# Patient Record
Sex: MALE | Race: WHITE
[De-identification: names, ages, dates, MRNs, and addresses within clinical notes are randomized per-mention and may not be internally consistent; named-entity substitution may affect disease eponyms.]

---

## 2017-11-27 ENCOUNTER — HOSPITAL ENCOUNTER (OUTPATIENT)
Dept: HOSPITAL 25 - ED | Age: 52
Setting detail: OBSERVATION
LOS: 1 days | Discharge: HOME | End: 2017-11-28
Attending: INTERNAL MEDICINE | Admitting: HOSPITALIST
Payer: COMMERCIAL

## 2017-11-27 DIAGNOSIS — Z23: ICD-10-CM

## 2017-11-27 DIAGNOSIS — M51.36: ICD-10-CM

## 2017-11-27 DIAGNOSIS — M25.551: ICD-10-CM

## 2017-11-27 DIAGNOSIS — K21.9: ICD-10-CM

## 2017-11-27 DIAGNOSIS — Z79.899: ICD-10-CM

## 2017-11-27 DIAGNOSIS — G47.33: ICD-10-CM

## 2017-11-27 DIAGNOSIS — M51.26: Primary | ICD-10-CM

## 2017-11-27 DIAGNOSIS — I10: ICD-10-CM

## 2017-11-27 LAB
ALBUMIN SERPL BCG-MCNC: 4.5 G/DL (ref 3.2–5.2)
ALP SERPL-CCNC: 44 U/L (ref 34–104)
ALT SERPL W P-5'-P-CCNC: 19 U/L (ref 7–52)
ANION GAP SERPL CALC-SCNC: 9 MMOL/L (ref 2–11)
AST SERPL-CCNC: 19 U/L (ref 13–39)
BUN SERPL-MCNC: 11 MG/DL (ref 6–24)
BUN/CREAT SERPL: 12.9 (ref 8–20)
CALCIUM SERPL-MCNC: 9.7 MG/DL (ref 8.6–10.3)
CHLORIDE SERPL-SCNC: 104 MMOL/L (ref 101–111)
GLOBULIN SER CALC-MCNC: 2.8 G/DL (ref 2–4)
GLUCOSE SERPL-MCNC: 98 MG/DL (ref 70–100)
HCO3 SERPL-SCNC: 25 MMOL/L (ref 22–32)
HCT VFR BLD AUTO: 44 % (ref 42–52)
HGB BLD-MCNC: 14.8 G/DL (ref 14–18)
MCH RBC QN AUTO: 30 PG (ref 27–31)
MCHC RBC AUTO-ENTMCNC: 34 G/DL (ref 31–36)
MCV RBC AUTO: 87 FL (ref 80–94)
POTASSIUM SERPL-SCNC: 3.5 MMOL/L (ref 3.5–5)
PROT SERPL-MCNC: 7.3 G/DL (ref 6.4–8.9)
RBC # BLD AUTO: 4.98 10^6/UL (ref 4–5.4)
SODIUM SERPL-SCNC: 138 MMOL/L (ref 133–145)
WBC # BLD AUTO: 9.7 10^3/UL (ref 3.5–10.8)

## 2017-11-27 PROCEDURE — 72148 MRI LUMBAR SPINE W/O DYE: CPT

## 2017-11-27 PROCEDURE — 99284 EMERGENCY DEPT VISIT MOD MDM: CPT

## 2017-11-27 PROCEDURE — G0008 ADMIN INFLUENZA VIRUS VAC: HCPCS

## 2017-11-27 PROCEDURE — 80053 COMPREHEN METABOLIC PANEL: CPT

## 2017-11-27 PROCEDURE — 36415 COLL VENOUS BLD VENIPUNCTURE: CPT

## 2017-11-27 PROCEDURE — 96374 THER/PROPH/DIAG INJ IV PUSH: CPT

## 2017-11-27 PROCEDURE — 96375 TX/PRO/DX INJ NEW DRUG ADDON: CPT

## 2017-11-27 PROCEDURE — G0378 HOSPITAL OBSERVATION PER HR: HCPCS

## 2017-11-27 PROCEDURE — 90686 IIV4 VACC NO PRSV 0.5 ML IM: CPT

## 2017-11-27 PROCEDURE — 85025 COMPLETE CBC W/AUTO DIFF WBC: CPT

## 2017-11-27 PROCEDURE — 96372 THER/PROPH/DIAG INJ SC/IM: CPT

## 2017-11-27 PROCEDURE — 86140 C-REACTIVE PROTEIN: CPT

## 2017-11-27 PROCEDURE — 72131 CT LUMBAR SPINE W/O DYE: CPT

## 2017-11-27 PROCEDURE — 72100 X-RAY EXAM L-S SPINE 2/3 VWS: CPT

## 2017-11-27 PROCEDURE — 96376 TX/PRO/DX INJ SAME DRUG ADON: CPT

## 2017-11-27 PROCEDURE — 90471 IMMUNIZATION ADMIN: CPT

## 2017-11-27 RX ADMIN — SODIUM CHLORIDE SCH MLS/HR: 900 IRRIGANT IRRIGATION at 22:39

## 2017-11-27 RX ADMIN — SENNOSIDES SCH TAB: 8.6 TABLET, FILM COATED ORAL at 22:38

## 2017-11-27 RX ADMIN — HEPARIN SODIUM SCH UNITS: 5000 INJECTION INTRAVENOUS; SUBCUTANEOUS at 22:37

## 2017-11-27 NOTE — RAD
INDICATION:  Low back pain.



COMPARISON:  Comparison is made with a prior x-ray study of the lumbar spine from January 12, 2016.



TECHNIQUE: 3 views of the lumbar spine were obtained including lateral, AP and a

coned-down lateral view of the lumbar sacral junction.



FINDINGS: There is a mild lumbar scoliosis convex toward the left side.  No fracture is

seen.



There is moderate degenerative disc disease at the L2-L3 level which appears unchanged.



IMPRESSION:  MODERATE DEGENERATIVE DISC DISEASE.

## 2017-11-28 VITALS — DIASTOLIC BLOOD PRESSURE: 74 MMHG | SYSTOLIC BLOOD PRESSURE: 133 MMHG

## 2017-11-28 RX ADMIN — HEPARIN SODIUM SCH: 5000 INJECTION INTRAVENOUS; SUBCUTANEOUS at 15:17

## 2017-11-28 RX ADMIN — KETOROLAC TROMETHAMINE PRN MG: 30 INJECTION, SOLUTION INTRAMUSCULAR; INTRAVENOUS at 00:48

## 2017-11-28 RX ADMIN — SODIUM CHLORIDE SCH MLS/HR: 900 IRRIGANT IRRIGATION at 05:20

## 2017-11-28 RX ADMIN — HEPARIN SODIUM SCH UNITS: 5000 INJECTION INTRAVENOUS; SUBCUTANEOUS at 05:36

## 2017-11-28 RX ADMIN — SENNOSIDES SCH TAB: 8.6 TABLET, FILM COATED ORAL at 08:30

## 2017-11-28 RX ADMIN — KETOROLAC TROMETHAMINE PRN MG: 30 INJECTION, SOLUTION INTRAMUSCULAR; INTRAVENOUS at 08:30

## 2017-11-28 NOTE — RAD
Indication: Back pain with radiation down the right lower extremity.



Image Sequences: Sagittal T1, T2, STIR, axial T1 and T2-weighted images of the lumbar

spine were obtained.



The vertebral bodies appear normal in height. Normal bone marrow signal is noted.



At L5-S1, there is degenerative disc disease noted. Broad-based protrusion flattens the

thecal sac. No central or foraminal stenosis is noted.



At L4-5, there is minimal broad-based protrusion flattening the thecal sac. No central or

foraminal stenosis is noted. Moderate facet hypertrophy is noted.



At L3-4, there is degenerative disc disease noted. Moderate-sized central focal protrusion

is noted indenting the thecal sac. There is an extruded disc fragment which has migrated

inferiorly and appears to impinge upon the right descending L4 nerve root at this level.

The disc appears to be in the lateral recess on the right.



At L2-3, degenerative disc disease is noted. Minimal broad-based protrusion is noted.



IMPRESSION: 

1.  Central disc protrusion indenting the thecal sac at L3-4. There is a sequestered

fragment of disc that has migrated inferiorly into the right which appears to impinge upon

the right descending nerve root at L3-4.



2.  Degenerative disc disease at L4-5 and L5-S1.

## 2017-11-29 NOTE — DS
CC:  Bertin Ngo MD *

 

DISCHARGE SUMMARY:

 

DATE OF ADMISSION:  11/27/17

 

DATE OF DISCHARGE:  11/28/17

 

PRIMARY CARE PHYSICIAN:  Bertin Ngo MD

 

PRIMARY DIAGNOSES:

1.  Worsening disk protrusion L3-L4 with small piece of small sequestered 
fragments.

2.  Acute pain exacerbation.

 

SECONDARY DIAGNOSES:  Include:

1.  Chronic lower back pain and hip pain.

2.  Obstructive sleep apnea.

3.  Gastroesophageal reflux disease.

4.  Hypertension.

 

MEDICATIONS AT DISCHARGE:  Include:

1.  Cyclobenzaprine 5 to 10 mg daily as needed.

2.  Diclofenac 75 mg twice daily as needed.

3.  Prilosec 20 mg daily.

4.  Lisinopril 20 mg daily.

5.  Oxycodone 5 mg every 4 hours as needed for pain for 2 days.

6.  Acetaminophen 650 mg every 4 hours as needed for pain.

 

PERTINENT IMAGING STUDIES:  MRI of the L-spine was performed, read was 
discussed with Radiology, interpreted as continued disk protrusion of L3-L4 
with a new piece of small sequestered fragments impinging on nerve roots on the 
right.

 

HISTORY OF PRESENT ILLNESS AND HOSPITAL COURSE:  This is a 52-year-old man with 
past medical history as outlined in the history of present illness on day of 
admission presented to the hospital with worsening right lower back pain that 
developed after moving boxes and working in his garage on 11/21/17 after 
hearing something "pop."  Subsequently, he had a long car ride but the pain 
continues to worsen.  When seen in the emergency room, his pain was 
uncontrolled with high doses of narcotics.  He was admitted to the hospital and 
given 1 dose of high dose steroids, 10 mg of Decadron.  When seen the next 
morning, his pain had largely improved.  He is neurovascularly intact.  He did 
have positive straight leg test on the right and ipsilateral straight leg test 
on the left.  He had intact sensation reflexes and strength, although his right 
lower leg strength was 4/5 limited by pain.  He had no bladder retention or 
incontinence.  No bowel incontinence.  He felt improved since day of discharge, 
although his pain continued.  In the setting of the sequestered disk, he likely 
has a new exacerbation of pain secondary to this problem.  It was expected that 
this would be desiccated over the next 6 weeks without need for acute 
intervention at this time in the absence of neurologic compromise.  However, 
the patient was counseled at length, should he develop any neurological 
compromise including weakness, numbness, bladder or bowel incontinence, he 
should emergently return to the emergency room.  Also counseled that 
expeditious followup with his primary care provider was important.

 

Reasons to return to the hospital including but limited to recurrent or 
worsening symptoms, worsening pain, bladder or bowel incontinence, weakness or 
numbness or tingling in lower extremities discussed at length, the patient 
acknowledged understanding.

 

TIME SPENT:  Greater than 45 minutes was spent on discharging the patient; 
greater than half was spent face-to-face with the patient.

 

 [id number]/[facility] #: [id number]

NANCY

## 2017-12-01 NOTE — ED
Constantin LUCAS Thomas, scribed for Venkata Cruz MD on 11/27/17 at 1447 .





Back Pain





- HPI Summary


HPI Summary: 





The pt is a 51 y/o M presenting to the ED c/o lower back pain that began six 

days ago. The pain has progressively worsened since onset, and the pain 

significantly worsened two days ago. The pain radiates down his right buttock 

and shelter down his right thigh. The pain is constant. The pain is rated 9/10. 

The pain is aggravated by ambulation and positioning. The pain is alleviated by 

lying on his abdomen. The patient has treated the pain with ibuprofen, 

diclofenac, and cyclobenzaprine PTA. Patient denies bladder or bowel 

incontinence. PMHx includes chronic back pain. 





- History of Current Complaint


Chief Complaint: EDBackInjuryPain


Stated Complaint: BACK PAIN


Time Seen by Provider: 11/27/17 14:29


Hx Obtained From: Patient


Onset/Duration: Lasting Days - 6, Still Present, Worse Since - 2 days ago


Onset/Duration: Still Present


Timing: Constant


Back Pain Location: Is Discrete @ - lower


Severity Initially: Mild


Severity Currently: Severe


Pain Intensity: 9


Pain Scale Used: 0-10 Numeric


Aggravating Symptom(s): Other - Ambulation, positioning


Alleviating Symptom(s): Other - Lying on his abdomen


Associated Signs And Symptoms: Negative: Bladder Incontinence, Bowel 

Incontinence





- Allergies/Home Medications


Allergies/Adverse Reactions: 


 Allergies











Allergy/AdvReac Type Severity Reaction Status Date / Time


 


environmental Allergy  Unknown Uncoded 02/08/17 14:39





   Reaction  





   Details  


 


NKDA Allergy  Unknown Uncoded 02/08/17 14:39





   Reaction  





   Details  


 


seasonal Allergy  Unknown Uncoded 02/08/17 14:39





   Reaction  





   Details  











Home Medications: 


 Home Medications





Cyclobenzaprine TAB* [Flexeril 10 MG TAB*] 5 - 10 mg PO DAILY PRN 11/27/17 [

History Confirmed 11/27/17]


Diclofenac Sodium EC TAB* [Voltaren EC TAB*] 75 mg PO BID PRN 11/27/17 [History 

Confirmed 11/27/17]


Omeprazole CAP* [Prilosec CAP* 20 MG] 20 mg PO DAILY 11/27/17 [History 

Confirmed 11/27/17]











PMH/Surg Hx/FS Hx/Imm Hx


Previously Healthy: No


Endocrine/Hematology History: 


   Denies: Hx Diabetes


Cardiovascular History: Reports: Hx Hypercholesterolemia, Hx Hypertension


   Denies: Hx Pacemaker/ICD


Respiratory History: Reports: Hx Seasonal Allergies, Hx Sleep Apnea - 

compliance issues;switched to dental device


 History: 


   Denies: Hx Renal Disease


Musculoskeletal History: Reports: Hx Back Problems - back pain, spasms


Sensory History: 


   Denies: Hx Hearing Aid


Psychiatric History: 


   Denies: Hx Panic Disorder





- Surgical History


Surgery Procedure, Year, and Place: None


Infectious Disease History: No


Infectious Disease History: 


   Denies: Traveled Outside the [place] in Last 30 Days





- Family History


Known Family History: Positive: Hypertension





- Social History


Alcohol Use: Occasionally


Hx Substance Use: No


Substance Use Type: Reports: None


Hx Tobacco Use: No


Smoking Status (MU): Never Smoked Tobacco


Have You Smoked in the Last Year: No





Review of Systems


Negative: Other - bowel incontinence


Negative: incontinence


Positive: Other - Low back pain


All Other Systems Reviewed And Are Negative: Yes





Physical Exam





- Summary


Physical Exam Summary: 





VITAL SIGNS: Reviewed.


GENERAL: Patient is a well-developed and nourished male who is lying 

comfortable in the stretcher. Patient is not in any acute respiratory distress.


HEAD AND FACE: No signs of trauma. No ecchymosis, hematomas or skull 

depressions. No sinus tenderness.


EYES: PERRLA, EOMI x 2, No injected conjunctiva, no nystagmus.


EARS: Hearing grossly intact. Ear canals and tympanic membranes are within 

normal limits.


MOUTH: Oropharynx within normal limits.


NECK: Supple, trachea is midline, no adenopathy, no JVD, no carotid bruit, no c-

spine tenderness, neck with full ROM.


CHEST: Symmetric, no tenderness at palpation


LUNGS: Clear to auscultation bilaterally. No wheezing or crackles.


CVS: Regular rate and rhythm, S1 and S2 present, no murmurs or gallops 

appreciated.


ABDOMEN: Soft, non-tender. No signs of distention. No rebound no guarding, and 

no masses palpated. Bowel sounds are normal.


BACK: There is paraspinal tenderness along the lumbar spine. There is positive 

tenderness in the right gluteus. 


EXTREMITIES: FROM in all major joints, no edema, no cyanosis or clubbing.


NEURO: Alert and oriented x 3. No acute neurological deficits. Speech is normal 

and follows commands.


SKIN: Dry and warm


Triage Information Reviewed: Yes


Vital Signs On Initial Exam: 


 Initial Vitals











Temp Pulse Resp BP Pulse Ox


 


 99.2 F   78   20   159/84   96 


 


 11/27/17 13:57  11/27/17 13:57  11/27/17 13:57  11/27/17 13:57  11/27/17 13:57











Vital Signs Reviewed: Yes





- Rutherford Coma Scale


Coma Scale Total: 15





Diagnostics





- Vital Signs


 Vital Signs











  Temp Pulse Resp BP Pulse Ox


 


 11/27/17 13:57  99.2 F  78  20  159/84  96














- Laboratory


Result Diagrams: 


 11/27/17 15:00





 11/27/17 15:00


Lab Statement: Any lab studies that have been ordered have been reviewed, and 

results considered in the medical decision making process.





- Radiology


  ** XR L-Spine


Xray Interpretation: No Acute Changes - MODERATE DEGENERATIVE DISC DISEASE. ED 

physician has reviewed this report and agrees.


Radiology Interpretation Completed By: Radiologist





- CT


  ** CT L-Spine


CT Interpretation: No Acute Changes - MILD TO MODERATE DEGENERATIVE DISEASE AS 

DESCRIBED. PLEASE FOR ALSO TO EARLIER MRI REPORT. ED physician has reviewed 

this report and agrees.


CT Interpretation Completed By: Radiologist





Back Pain Course/Dx





- Course


Assessment/Plan: The pt is a 51 y/o M presenting to the ED c/o lower back pain 

that began six days ago. The pain has progressively worsened since onset, and 

the pain significantly worsened two days ago. The pain radiates down his right 

buttock and shelter down his right thigh. The pain is constant. The pain is 

rated 9/10. The pain is aggravated by ambulation and positioning. The pain is 

alleviated by lying on his abdomen. The patient has treated the pain with 

ibuprofen, diclofenac, and cyclobenzaprine PTA. Patient denies bladder or bowel 

incontinence. PMHx includes chronic back pain.  Test results are without 

significant abnormalities. The XR L-Spine shows MODERATE DEGENERATIVE DISC 

DISEASE. In the ED course, the patient was given Norflex, morphine, Decadrol, 

and Toradol for the pain. The patient continues to have pain; therefore, I 

decided to do a CT L-Spine which shows no significant abnormalities except DDD. 

The patient was given another dose of morphine; however, the patient is unable 

to ambulate and still has severe pain. Therefore, I discussed the case with Dr. Link who will accept the patient for admission. The patient is 

hemodynamically stable and alert and oriented x3.





- Diagnoses


Provider Diagnoses: 


 Intractable back pain








- Provider Notifications


Discussed Care Of Patient With: Pat Link


Time Discussed With Above Provider: 18:20


Instructed by Provider To: Other - Dr. Link, hospitalist, will admit the 

patient.





Discharge





- Discharge Plan


Condition: Fair


Disposition: ADMITTED TO [facility]


Referrals: 


Bertin Ngo MD [Primary Care Provider] - 





The documentation as recorded by the Constantin marcelo Thomas accurately reflects 

the service I personally performed and the decisions made by me, Venkata Cruz MD.

## 2022-11-08 ENCOUNTER — HOSPITAL ENCOUNTER (EMERGENCY)
Dept: HOSPITAL 92 - CSHERS | Age: 57
Discharge: HOME | End: 2022-11-08

## 2022-11-08 DIAGNOSIS — R07.9: ICD-10-CM

## 2022-11-08 DIAGNOSIS — T63.481A: Primary | ICD-10-CM

## 2022-11-08 DIAGNOSIS — I10: ICD-10-CM

## 2022-11-08 LAB
ALBUMIN SERPL BCG-MCNC: 4.3 G/DL (ref 3.5–5)
ALP SERPL-CCNC: 68 U/L (ref 40–110)
ALT SERPL W P-5'-P-CCNC: 29 U/L (ref 8–55)
ANION GAP SERPL CALC-SCNC: 15 MMOL/L (ref 10–20)
AST SERPL-CCNC: 25 U/L (ref 5–34)
BASOPHILS # BLD AUTO: 0 10X3/UL (ref 0–0.2)
BASOPHILS NFR BLD AUTO: 0.5 % (ref 0–2)
BILIRUB SERPL-MCNC: 1.1 MG/DL (ref 0.2–1.2)
BUN SERPL-MCNC: 9 MG/DL (ref 8.4–25.7)
CALCIUM SERPL-MCNC: 9.7 MG/DL (ref 7.8–10.44)
CHLORIDE SERPL-SCNC: 107 MMOL/L (ref 98–107)
CO2 SERPL-SCNC: 24 MMOL/L (ref 22–29)
CREAT CL PREDICTED SERPL C-G-VRATE: 0 ML/MIN (ref 70–130)
EOSINOPHIL # BLD AUTO: 0.3 10X3/UL (ref 0–0.5)
EOSINOPHIL NFR BLD AUTO: 4.1 % (ref 0–6)
GLOBULIN SER CALC-MCNC: 2.5 G/DL (ref 2.4–3.5)
GLUCOSE SERPL-MCNC: 114 MG/DL (ref 70–105)
HGB BLD-MCNC: 16 G/DL (ref 13.5–17.5)
LIPASE SERPL-CCNC: 19 U/L (ref 8–78)
LYMPHOCYTES NFR BLD AUTO: 44.2 % (ref 18–47)
MCH RBC QN AUTO: 30 PG (ref 27–33)
MCV RBC AUTO: 84.2 FL (ref 81.2–95.1)
MONOCYTES # BLD AUTO: 0.8 10X3/UL (ref 0–1.1)
MONOCYTES NFR BLD AUTO: 11.3 % (ref 0–10)
NEUTROPHILS # BLD AUTO: 2.6 10X3/UL (ref 1.5–8.4)
NEUTROPHILS NFR BLD AUTO: 39.6 % (ref 40–75)
PLATELET # BLD AUTO: 351 10X3/UL (ref 150–450)
POTASSIUM SERPL-SCNC: 3.7 MMOL/L (ref 3.5–5.1)
RBC # BLD AUTO: 5.33 10X6/UL (ref 4.32–5.72)
SODIUM SERPL-SCNC: 142 MMOL/L (ref 136–145)
TROPONIN I SERPL DL<=0.01 NG/ML-MCNC: 0.01 NG/ML (ref ?–0.03)
WBC # BLD AUTO: 6.7 10X3/UL (ref 3.5–10.5)

## 2022-11-08 PROCEDURE — 85025 COMPLETE CBC W/AUTO DIFF WBC: CPT

## 2022-11-08 PROCEDURE — 80053 COMPREHEN METABOLIC PANEL: CPT

## 2022-11-08 PROCEDURE — 36415 COLL VENOUS BLD VENIPUNCTURE: CPT

## 2022-11-08 PROCEDURE — 71045 X-RAY EXAM CHEST 1 VIEW: CPT

## 2022-11-08 PROCEDURE — S0028 INJECTION, FAMOTIDINE, 20 MG: HCPCS

## 2022-11-08 PROCEDURE — 96374 THER/PROPH/DIAG INJ IV PUSH: CPT

## 2022-11-08 PROCEDURE — 93005 ELECTROCARDIOGRAM TRACING: CPT

## 2022-11-08 PROCEDURE — 83690 ASSAY OF LIPASE: CPT

## 2022-11-08 PROCEDURE — 84484 ASSAY OF TROPONIN QUANT: CPT
